# Patient Record
(demographics unavailable — no encounter records)

---

## 2025-02-13 NOTE — DISCUSSION/SUMMARY
[FreeTextEntry1] : Mr. Arteaga has history of exposure to agent orange in [place].  His spirometry showed restrictive disease and he was instructed to come to a pulmonary physician.  He denies all symptoms.  His lungs are clear.  Pulmonary function test here reveal mild restrictive disease with no significant diffusion defect.  Prior CAT scan in 21 showed no interstitial lung disease.  I have asked him to obtain a chest x-ray.  If this is clear and he is asymptomatic no further therapy is indicated.  He understands and agrees. The patient understands and agrees with plan of care. Today's office visit encompassed 46 minutes. I conducted an extensive history,physical exam and reviewed diagnosis and treatment options including diagnostic tests,radiology studies including cat scans and the use of prescription medication.

## 2025-02-13 NOTE — REASON FOR VISIT
[Initial] : an initial visit [Pulmonary Nodules] : pulmonary nodules [TextBox_44] : chemical agent orange exposure [TextEntry] : Patient states he is here to establish care. He has history of exposure to agent orange in [place]. Patient also has history of a pulmonary nodule. He has no Pulmonary complaints.  Patient does have Sleep apnea and uses a CPAP; [facility]

## 2025-02-13 NOTE — HISTORY OF PRESENT ILLNESS
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

## 2025-02-13 NOTE — PROCEDURE
[FreeTextEntry1] : Pulmonary function test 2/13/2025 mild restrictive disease.  This is similar to PFTs done at the [facility] on December 18, 2024.  CAT scan of the chest December 2021 compared with 2016 no change in 5 mm left lung nodule and no change in 3 to 4 mm nodule right lung.  All else negative.